# Patient Record
Sex: MALE | Race: WHITE | NOT HISPANIC OR LATINO | URBAN - METROPOLITAN AREA
[De-identification: names, ages, dates, MRNs, and addresses within clinical notes are randomized per-mention and may not be internally consistent; named-entity substitution may affect disease eponyms.]

---

## 2021-11-09 ENCOUNTER — EMERGENCY (EMERGENCY)
Facility: HOSPITAL | Age: 81
LOS: 1 days | Discharge: HOME | End: 2021-11-09
Attending: EMERGENCY MEDICINE | Admitting: EMERGENCY MEDICINE
Payer: MEDICARE

## 2021-11-09 VITALS
HEART RATE: 64 BPM | TEMPERATURE: 98 F | DIASTOLIC BLOOD PRESSURE: 75 MMHG | RESPIRATION RATE: 18 BRPM | OXYGEN SATURATION: 97 % | WEIGHT: 182.1 LBS | HEIGHT: 70 IN | SYSTOLIC BLOOD PRESSURE: 155 MMHG

## 2021-11-09 VITALS
DIASTOLIC BLOOD PRESSURE: 72 MMHG | OXYGEN SATURATION: 98 % | TEMPERATURE: 98 F | SYSTOLIC BLOOD PRESSURE: 145 MMHG | RESPIRATION RATE: 17 BRPM | HEART RATE: 62 BPM

## 2021-11-09 DIAGNOSIS — E78.5 HYPERLIPIDEMIA, UNSPECIFIED: ICD-10-CM

## 2021-11-09 DIAGNOSIS — R07.89 OTHER CHEST PAIN: ICD-10-CM

## 2021-11-09 DIAGNOSIS — Z88.2 ALLERGY STATUS TO SULFONAMIDES: ICD-10-CM

## 2021-11-09 DIAGNOSIS — Z87.891 PERSONAL HISTORY OF NICOTINE DEPENDENCE: ICD-10-CM

## 2021-11-09 DIAGNOSIS — I10 ESSENTIAL (PRIMARY) HYPERTENSION: ICD-10-CM

## 2021-11-09 LAB
ALBUMIN SERPL ELPH-MCNC: 4.2 G/DL — SIGNIFICANT CHANGE UP (ref 3.3–5)
ALP SERPL-CCNC: 73 U/L — SIGNIFICANT CHANGE UP (ref 40–120)
ALT FLD-CCNC: 22 U/L — SIGNIFICANT CHANGE UP (ref 10–45)
ANION GAP SERPL CALC-SCNC: 10 MMOL/L — SIGNIFICANT CHANGE UP (ref 5–17)
AST SERPL-CCNC: 22 U/L — SIGNIFICANT CHANGE UP (ref 10–40)
BASOPHILS # BLD AUTO: 0.05 K/UL — SIGNIFICANT CHANGE UP (ref 0–0.2)
BASOPHILS NFR BLD AUTO: 0.7 % — SIGNIFICANT CHANGE UP (ref 0–2)
BILIRUB SERPL-MCNC: 0.2 MG/DL — SIGNIFICANT CHANGE UP (ref 0.2–1.2)
BUN SERPL-MCNC: 26 MG/DL — HIGH (ref 7–23)
CALCIUM SERPL-MCNC: 9.5 MG/DL — SIGNIFICANT CHANGE UP (ref 8.4–10.5)
CHLORIDE SERPL-SCNC: 108 MMOL/L — SIGNIFICANT CHANGE UP (ref 96–108)
CK MB CFR SERPL CALC: 3.6 NG/ML — SIGNIFICANT CHANGE UP (ref 0–6.7)
CK SERPL-CCNC: 117 U/L — SIGNIFICANT CHANGE UP (ref 30–200)
CO2 SERPL-SCNC: 22 MMOL/L — SIGNIFICANT CHANGE UP (ref 22–31)
CREAT SERPL-MCNC: 1.32 MG/DL — HIGH (ref 0.5–1.3)
EOSINOPHIL # BLD AUTO: 0.17 K/UL — SIGNIFICANT CHANGE UP (ref 0–0.5)
EOSINOPHIL NFR BLD AUTO: 2.3 % — SIGNIFICANT CHANGE UP (ref 0–6)
GLUCOSE SERPL-MCNC: 134 MG/DL — HIGH (ref 70–99)
HCT VFR BLD CALC: 40 % — SIGNIFICANT CHANGE UP (ref 39–50)
HGB BLD-MCNC: 12.9 G/DL — LOW (ref 13–17)
IMM GRANULOCYTES NFR BLD AUTO: 0.4 % — SIGNIFICANT CHANGE UP (ref 0–1.5)
LYMPHOCYTES # BLD AUTO: 1.55 K/UL — SIGNIFICANT CHANGE UP (ref 1–3.3)
LYMPHOCYTES # BLD AUTO: 20.6 % — SIGNIFICANT CHANGE UP (ref 13–44)
MCHC RBC-ENTMCNC: 30.3 PG — SIGNIFICANT CHANGE UP (ref 27–34)
MCHC RBC-ENTMCNC: 32.3 GM/DL — SIGNIFICANT CHANGE UP (ref 32–36)
MCV RBC AUTO: 93.9 FL — SIGNIFICANT CHANGE UP (ref 80–100)
MONOCYTES # BLD AUTO: 0.82 K/UL — SIGNIFICANT CHANGE UP (ref 0–0.9)
MONOCYTES NFR BLD AUTO: 10.9 % — SIGNIFICANT CHANGE UP (ref 2–14)
NEUTROPHILS # BLD AUTO: 4.89 K/UL — SIGNIFICANT CHANGE UP (ref 1.8–7.4)
NEUTROPHILS NFR BLD AUTO: 65.1 % — SIGNIFICANT CHANGE UP (ref 43–77)
NRBC # BLD: 0 /100 WBCS — SIGNIFICANT CHANGE UP (ref 0–0)
PLATELET # BLD AUTO: 192 K/UL — SIGNIFICANT CHANGE UP (ref 150–400)
POTASSIUM SERPL-MCNC: 4.8 MMOL/L — SIGNIFICANT CHANGE UP (ref 3.5–5.3)
POTASSIUM SERPL-SCNC: 4.8 MMOL/L — SIGNIFICANT CHANGE UP (ref 3.5–5.3)
PROT SERPL-MCNC: 6.8 G/DL — SIGNIFICANT CHANGE UP (ref 6–8.3)
RBC # BLD: 4.26 M/UL — SIGNIFICANT CHANGE UP (ref 4.2–5.8)
RBC # FLD: 12.8 % — SIGNIFICANT CHANGE UP (ref 10.3–14.5)
SODIUM SERPL-SCNC: 140 MMOL/L — SIGNIFICANT CHANGE UP (ref 135–145)
TROPONIN T SERPL-MCNC: 0.01 NG/ML — SIGNIFICANT CHANGE UP (ref 0–0.01)
WBC # BLD: 7.51 K/UL — SIGNIFICANT CHANGE UP (ref 3.8–10.5)
WBC # FLD AUTO: 7.51 K/UL — SIGNIFICANT CHANGE UP (ref 3.8–10.5)

## 2021-11-09 PROCEDURE — 82550 ASSAY OF CK (CPK): CPT

## 2021-11-09 PROCEDURE — 93005 ELECTROCARDIOGRAM TRACING: CPT

## 2021-11-09 PROCEDURE — 84484 ASSAY OF TROPONIN QUANT: CPT

## 2021-11-09 PROCEDURE — 36415 COLL VENOUS BLD VENIPUNCTURE: CPT

## 2021-11-09 PROCEDURE — 80053 COMPREHEN METABOLIC PANEL: CPT

## 2021-11-09 PROCEDURE — 85025 COMPLETE CBC W/AUTO DIFF WBC: CPT

## 2021-11-09 PROCEDURE — 99283 EMERGENCY DEPT VISIT LOW MDM: CPT | Mod: 25

## 2021-11-09 PROCEDURE — 71046 X-RAY EXAM CHEST 2 VIEWS: CPT

## 2021-11-09 PROCEDURE — 71046 X-RAY EXAM CHEST 2 VIEWS: CPT | Mod: 26,77

## 2021-11-09 PROCEDURE — 82553 CREATINE MB FRACTION: CPT

## 2021-11-09 PROCEDURE — 99285 EMERGENCY DEPT VISIT HI MDM: CPT | Mod: 25

## 2021-11-09 PROCEDURE — 71046 X-RAY EXAM CHEST 2 VIEWS: CPT | Mod: 26

## 2021-11-09 NOTE — ED PROVIDER NOTE - PATIENT PORTAL LINK FT
You can access the FollowMyHealth Patient Portal offered by [facility] by registering at the following website: [email or web address]. By joining [employer]’s FollowMyHealth portal, you will also be able to view your health information using other applications (apps) compatible with our system.

## 2021-11-09 NOTE — ED ADULT NURSE NOTE - CHIEF COMPLAINT QUOTE
Patient:   JASON QUINN            MRN: [id number]            FIN: [id number]              Age:   79 years     Sex:  MALE     :  44   Associated Diagnoses:   None   Author:   KANU AGUILAR     Basic Information   Admit information:     Today's Information:  .  Denies any shortness of breath no cough no fever no chills, blood sugars improved . no pain in the ankle . awaiting snf acceptance  .      Review of Systems   Constitutional:  No fever, No chills.    Eye:  No recent visual problem.    Ear/Nose/Mouth/Throat:  No decreased hearing.    Respiratory:  No shortness of breath, No cough.    Cardiovascular:  No chest pain, No palpitations.    Gastrointestinal:  No nausea, No vomiting.    Genitourinary:  No dysuria.    Hematology/Lymphatics:  No bruising tendency.    Neurologic:  Alert and oriented X4.    Psychiatric:  No anxiety.    ROS reviewed as documented in chart     Health Status   Allergies: Allergies (4) Active Reaction  Cipro None Documented  Latex naproxen  naproxen None Documented  PCN (penicillins) None Documented      Current medications: Home Medications (14) Active  Anoro Ellipta inhaler oral 62.5-25 mcg/puff powder 1 puff, Inhaled, Daily  cloNIDine topical 0.1 mg/24 hr weekly patch 1 patch, TransDermal, Q7 Days  Colace (sodium) oral 100 mg capsule 100 mg = 1 cap, Oral, Daily  Coreg oral 6.25 tablet 6.25 mg = 1 tab, Oral, BID  ergocalciferol (vitamin D2) oral 50,000 unit (1.25 mg) capsule 50,000 unit = 1 cap, Oral, Q Month  indapamide oral 2.5 mg tablet 2.5 mg = 1 tab, Oral, QAM  insulin glargine (Lantus) subcutaneous injection 100 unit/mL 35 unit, Subcutaneous, Q Bedtime  isosorbide mononitrate oral 30 mg ER tablet (Imdur) 30 mg = 1 tab, Oral, TID  Lasix oral 20 mg tablet 20 mg = 1 tab, Oral, Q Bedtime  Lipitor oral 20 mg tablet 20 mg = 1 tab, Oral, Q Bedtime  lisinopril oral 20 mg tablet 20 mg = 1 tab, Oral, Daily  melatonin oral 3 mg tablet 3 mg = 1 tab, PRN, Oral, Q  Bedtime  nicotine 21 mg/24 hr transdermal film, extended release 1 patch, TransDermal, Daily  potassium CHLORIDE oral 20 mEq ER tablet (KCl / K-Dur) 20 mEq = 1 tab, Oral, BID  Medications (27) Active  Scheduled: (15)  *Potassium Protocol Communication  1 each, N/A, Daily  *Potassium Protocol Communication  1 each, N/A, Daily  AmLODIPine 5 mg tab  5 mg 1 tab, Oral, Daily  Atorvastatin 20 mg tab  20 mg 1 tab, Oral, Q Bedtime  Carvedilol 25 mg tab  25 mg 1 tab, Oral, Q12H  CloNIDine TTS 0.1 mg/24 hr weekly ER patch  1 patch, TransDermal, Q7 Days  Docusate sodium 100 mg cap  100 mg 1 cap, Oral, Daily  Ergocalciferol 50,000 unit (1.25 mg) cap  50,000 unit 1 cap, Oral, Q Month  Heparin 5,000 unit/1 mL inj MDV  5,000 unit 1 mL, Subcutaneous, Q8H  HydrALAZINE 50 mg tab  100 mg 2 tab, Oral, Q8H  Indapamide 2.5 mg tab  2.5 mg 1 tab, Oral, QAM  insulin glargine  10 unit 0.1 mL, Subcutaneous, Q Bedtime  Nicotine 21 mg/24 hr daily ER patch  1 patch, TransDermal, Daily  nicotine topical PATCH REMOVAL`  Remove Patch:, TransDermal, Q Bedtime  Umeclidinium-vilanterol 62.5-25 mcg inhalation powder 7s  1 puff, Inhaled, Daily  Continuous: (0)  PRN: (12)  Acetaminophen 325 mg tab  650 mg 2 tab, Oral, Q6H  Chloraseptic (phenol) 1.4% spray 180 mL BULK  1 spray, Oral Mucosa, QID  Dextrose (glucose) 40% 15 gm/37.5 gm oral gel UD  15 gm, Oral, As Directed PRN  Dextrose (glucose) 50% 25 gm/50 mL syringe  12.5 gm 25 mL, IV Push, As Directed PRN  DiphenhydrAMINE 25 mg cap  25 mg 1 cap, Oral, Q Bedtime  Docusate sodium 100 mg cap  100 mg 1 cap, Oral, BID  Glucagon 1 mg/1 mL emergency kit SDV  1 mg 1 mL, IM, As Directed PRN  GuaiFENesin 200 mg/10 mL oral liquid repack  100 mg 5 mL, Oral, Q4H  HydrALAZINE 25 mg tab  25 mg 1 tab, Oral, Q6H  Hydrocodone-acetaminophen 5-325 mg tab  1 tab, Oral, Q6H  Melatonin 3 mg tab  3 mg 1 tab, Oral, Q Bedtime  Ondansetron 4 mg/2 mL inj SDV  4 mg 2 mL, Slow IV Push, Q8H        Physical Examination   Intake and  Output   LINES  Peripheral Intravenous Antecubital Right   Gauge: 20   Charted: 07/01/20 19:45  Inserted: 06/30/20   Days Since Insertion: 1 days  Indication of Use: Saline Lock  I & O between:  01-JUL-2020 16:15 TO 02-JUL-2020 16:15  Med Dosing Weight:  100  kg   22-JUN-2020  24 Hour Intake:   0.00  ( 0.00 mL/kg )  24 Hour Output:   1250.00           24 Hour Urine/Stool Output:   0.0  24 Hour Balance:   -1250.00           24 Hour Urine Output:   1250.00  ( 0.52 mL/kg/hr )                   Urine Count:  3.00         VS/Measurements   Vitals between:   01-JUL-2020 16:15:34   TO   02-JUL-2020 16:15:34                   LAST RESULT MINIMUM MAXIMUM  Temperature 36.6 36.3 36.8  Heart Rate 70 55 70  Respiratory Rate 16 16 18  NISBP           145 145 179  NIDBP           70 69 85  SpO2                    96 93 96      General:  Alert and oriented, No acute distress.    Eye:  Normal conjunctiva.    HENT:  Normocephalic, Normal hearing.    Neck:  Supple.    Respiratory:  Lungs are clear to auscultation, Respirations are non-labored.   Cardiovascular:  Normal rate, Regular rhythm.    Gastrointestinal:  Soft, Non-tender, Non-distended.    Musculoskeletal     Left foot Ace wrap in place.     Integumentary:  Warm.    Neurologic:  Alert, No focal deficits.    Psychiatric:  Cooperative, Appropriate mood & affect.      Review / Management   Laboratory Results   Labs between:  01-JUL-2020 16:15 to 02-JUL-2020 16:15  CBC:                 WBC  HgB  Hct  Plt  MCV  RDW   02-JUL-2020 4.7  (L) 8.7  (L) 27.1  (L) 116  94.1  13.1   DIFF:                 Seg  Neutroph//ABS  Lymph//ABS  Mono//ABS  EOS/ABS  02-JUL-2020 NOT APPLICABLE  53 // 2.5 28 // 1.3 13 // 0.6 6 // 0.3  BMP:                 Na  Cl  BUN  Glu   02-JUL-2020 142  104  (H) 61  (H) 127                              K  CO2  Cr  Ca                              3.8  28  (H) 3.20  (L) 8.2   POC GLU:                 Latest Result  Latest Date  Minimum  Min Date  Maximum  Max Date                              (H) 115  02-JUL-2020 (H) 115  02-JUL-2020 (H) 149  01-JUL-2020                     Radiology results       Lines and Tubes:    LINES  Peripheral Intravenous Antecubital Right   Gauge: 20   Charted: 07/01/20 19:45  Inserted: 06/30/20   Days Since Insertion: 1 days  Indication of Use: Saline Lock   .      Impression and Plan   Diagnosis     79-year-old history of lung cancer with liver mets, diabetes mellitus, hypertension, hyperlipidemia, CKD, renal cancer status post nephrectomy, bladder cancer, nicotine dependence initially admitted secondary to presyncope from hypotension after taking multiple antihypertensive medications and diuretics.  Presyncope from hypotension cardiology following patient currently on amlodipine, Coreg, clonidine patch, PRN hydralazine and indapamide blood pressure has been in 150s to 160s range with those medications cardiology following  CKD 4 creatinine stable needs outpatient follow-up with nephrology  acute left ankle fracture podiatry following has protective boot needs podiatry follow-up follow-up outpatient needs physical therapy awaiting to go to rehab  Diabetes mellitus with episode of hypoglycemia patient had blood sugars down to 30s .  decreased the dose of Lantus to 12 units. blood sugars improved  Hypertension currently on multiple medications with blood pressure in the 150s to 160s range  Dyslipidemia  continue statins  Hypertensive heart disease with CKD with resistant hypertension follow cardiology recommendations watch for orthostatic hypotension  Metastatic lung cancer patient supposed to get outpatient immunotherapy which is supposed to be mid July patient hesitant to go to rehab for that explained the patient that he will probably be able to do well with the physical therapy and be discharged by then physical therapy recommending dilated daily therapy  Acute thrombocytopenia platelet count slowly coming up needs outpatient follow-up with hematology   Full code  Inpatient  DVT prophylaxis subcu heparin  Did discuss with MARCELLE yang,, Dr. Magana  d/w marcelle yang and catrina from .  Per Catrina accepted at [facility] but due to the holiday weekend patient cannot go to rehab until Monday and they are requesting repeat covid  test. will rpt covid on sunday   .     Course:  Progressing as expected.   Patient c/o of right sided chest pain, moved to left side of chest, noted last Sunday, pain sharp and intermittent, no SOB, no dizziness/lightheadedness.  States feels like pain indigestion as noted to occur after eating.  EKG in progress.  Denies any cardiac Hx., states has HTN.

## 2021-11-09 NOTE — ED PROVIDER NOTE - CARE PROVIDERS DIRECT ADDRESSES
,DirectAddress_Unknown,[email or web address]

## 2021-11-09 NOTE — ED PROVIDER NOTE - PROVIDER TOKENS
PROVIDER:[TOKEN:[[id number]]],PROVIDER:[TOKEN:[[id number]]],PROVIDER:[TOKEN:[[id number]]],PROVIDER:[TOKEN:[[id number]]]

## 2021-11-09 NOTE — ED PROVIDER NOTE - CARE PROVIDER_API CALL
Jumana Clarke)  Cardiovascular Medicine  [address], [address]  [place] [address]  Phone: [phone]  Fax: [phone]  Follow Up Time:     Sandeep Trent)  Cardiovascular Disease; Internal Medicine  Cardiology [place], [address] [place] [address]  Phone: [phone]  Fax: [phone]  Follow Up Time:     Sheyla Llanes)  Cardiovascular Disease; Internal Medicine  [address], [address], [address]rd [place] [address]  Phone: [phone]  Fax: [phone]  Follow Up Time:     Todd Durán  CARDIOVASCULAR DISEASE  [address] [address]  Phone: [phone]  Fax: [phone]  Follow Up Time:

## 2021-11-09 NOTE — ED ADULT NURSE REASSESSMENT NOTE - NS ED NURSE REASSESS COMMENT FT1
pt endorsed from ZOFIA Esquivel for continuous care. currently, pt reports the chest pain feels better. will continue to monitor

## 2021-11-09 NOTE — ED PROVIDER NOTE - CLINICAL SUMMARY MEDICAL DECISION MAKING FREE TEXT BOX
CP - likely musculoskeletal.  Low susp for ACS.  Will encourage increase PO hydration for mild elev creatinine; pt states this is not new. Abd benign. Has good follow up in [place], but will be here for another 10 days.  Will recommend follow up with cardiologist in [facility]; return precautions given.  Pt expressed clear understanding. Heart score 3. low risk. Trop wnl. ekg nonischemic. CP - likely musculoskeletal.  Low susp for ACS.  Will encourage increase PO hydration for mild elev creatinine; pt states this is not new. Abd benign. Has good follow up in [place], but will be here for another 10 days.  Will recommend follow up with cardiologist in [facility]; return precautions given.  Pt expressed clear understanding.

## 2021-11-09 NOTE — ED ADULT NURSE NOTE - OBJECTIVE STATEMENT
Patient is a 80yo male reporting right sided chest pain on Sunday that progressed to the left side today. Denies shortness of breath, fevers, chills. Speaking clearly in full sentences.

## 2021-11-09 NOTE — ED PROVIDER NOTE - NS ED ROS FT
CONSTITUTIONAL: No fever, chills, or weakness  NEURO: No headache, no dizziness, no syncope; No focal weakness/tingling/numbness  EYES: No visual changes  ENT: No rhinorrhea or sore throat  PULM: No cough or dyspnea  CV: No palpitations, no edema.   GI: HPI.  No bloody stool or melena.   : No dysuria, hematuria, frequency  MSK: No neck pain or back pain, no joint pain  SKIN: no rash or unusual bruising

## 2021-11-09 NOTE — ED PROVIDER NOTE - NSFOLLOWUPINSTRUCTIONS_ED_ALL_ED_FT
Increase oral hydration.  Tylenol if needed for pain.  Please follow up with cardiologist this week or early next week.     Return to the Emergency Department if you have any new or worsening symptoms, or if you have any concerns.  ==========================    Chest Pain    WHAT YOU NEED TO KNOW:    Chest pain can be caused by a range of conditions, from not serious to life-threatening. Chest pain can be a symptom of a digestive problem, such as acid reflux or a stomach ulcer. An anxiety attack or a strong emotion, such as anger, can also cause chest pain. Infection, inflammation, or a fracture in the bones or cartilage in your chest can cause pain or discomfort. Sometimes chest pain or pressure is caused by poor blood flow to your heart (angina). Chest pain may also be caused by life-threatening conditions such as a heart attack or blood clot in your lungs.    DISCHARGE INSTRUCTIONS:    Call your local emergency number (911 in the ) or have someone call if:   •You have any of the following signs of a heart attack: ?Squeezing, pressure, or pain in your chest      ?You may also have any of the following: ?Discomfort or pain in your back, neck, jaw, stomach, or arm      ?Shortness of breath      ?Nausea or vomiting      ?Lightheadedness or a sudden cold sweat            Return to the emergency department if:   •You have chest discomfort that gets worse, even with medicine.      •You cough or vomit blood.      •Your bowel movements are black or bloody.      •You cannot stop vomiting, or it hurts to swallow.      Call your doctor if:   •You have questions or concerns about your condition or care.          Medicines:   •Medicines may be given to treat the cause of your chest pain. Examples include pain medicine, anxiety medicine, or medicines to increase blood flow to your heart.      •Do not take certain medicines without asking your healthcare provider first. These include NSAIDs, herbal or vitamin supplements, or hormones (estrogen or progestin).      •Take your medicine as directed. Contact your healthcare provider if you think your medicine is not helping or if you have side effects. Tell him or her if you are allergic to any medicine. Keep a list of the medicines, vitamins, and herbs you take. Include the amounts, and when and why you take them. Bring the list or the pill bottles to follow-up visits. Carry your medicine list with you in case of an emergency.      Healthy living tips: The following are general healthy guidelines. If the cause of your chest pain is known, your healthcare provider will give you specific guidelines to follow.  •Do not smoke. Nicotine and other chemicals in cigarettes and cigars can cause lung and heart damage. Ask your healthcare provider for information if you currently smoke and need help to quit. E-cigarettes or smokeless tobacco still contain nicotine. Talk to your healthcare provider before you use these products.      •Choose a variety of healthy foods as often as possible. Include fresh, frozen, or canned fruits and vegetables. Also include low-fat dairy products, fish, chicken (without skin), and lean meats. Your healthcare provider or a dietitian can help you create meal plans. You may need to avoid certain foods or drinks if your pain is caused by a digestion problem.  Healthy Foods           •Lower your sodium (salt) intake. Limit foods that are high in sodium, such as canned foods, salty snacks, and cold cuts. If you add salt when you cook food, do not add more at the table. Choose low-sodium canned foods as much as possible.             •Drink plenty of water every day. Water helps your body to control your temperature and blood pressure. Ask your healthcare provider how much water you should drink every day.      •Ask about activity. Your healthcare provider will tell you which activities to limit or avoid. Ask when you can drive, return to work, and have sex. Ask about the best exercise plan for you.      •Maintain a healthy weight. Ask your healthcare provider what a healthy weight is for you. Ask him or her to help you create a safe weight loss plan if you are overweight.      •Ask about vaccines you may need. Get the influenza (flu) vaccine every year as soon as recommended, usually in September or October. You may also need a pneumococcal vaccine to prevent pneumonia. The vaccine is usually given every 5 years, starting at age 65. Your healthcare provider can tell you if should get other vaccines, and when to get them.      Follow up with your healthcare provider within 72 hours, or as directed: You may need to return for more tests to find the cause of your chest pain. You may be referred to a specialist, such as a cardiologist or gastroenterologist. Write down your questions so you remember to ask them during your visits.

## 2021-11-09 NOTE — ED ADULT TRIAGE NOTE - CHIEF COMPLAINT QUOTE
Patient c/o of right sided chest pain, moved to left side of chest, noted last Sunday, pain sharp and intermittent, no SOB, no dizziness/lightheadedness.  States feels like pain indigestion as noted to occur after eating.  EKG in progress.  Denies any cardiac Hx., states has HTN.

## 2021-11-09 NOTE — ED PROVIDER NOTE - OBJECTIVE STATEMENT
80 yo m with Hx HTN and HLD controlled with meds; lives [place]icoastally between [place] and [place]; flew to [place] 4 days ago; having pain across upper abd/lower chest for the past 3 days.  Pain mainly on the left side, below (inferior) to left breast/pectoral region.  Has no pain currently; it occurs sporadically, usually with movement of his torso (ie reaching across body or twisting upper body), and lasts only apx 1 second at a time.  It has occurred apx 8-10 times today.  He and his wife walked 6 miles today without any SOB, and only had 1 episode of pain during that walk.  Has had some more belching than usual; took Pepcid AC today for it.  Has hx of GERD, was on Protonix in the past.  No hx of DVT/PE.  Gets a stress test annually, last had a stress-echo in 2021 which he says was normal.  Patient went to urgent care today for this, had a normal EKG and was told that the doctor there did not believe the pain was cardiac in nature, but advised to go to ED as a precaution.  Pt believes the pain may be musculoskeletal as a result of lifting heavy luggage to overhead compartment on his flight to [facility].      Pt is a former cigarette smoker, quit 40 yrs ago  No hx of drug use.    Family hx father  age 61 of heart disease, brother with heart disease in his 50s. 82 yo m with Hx HTN and HLD controlled with meds; [place]ico[place] between [place]; flew to [place] 4 days ago; having pain across upper abd/lower chest for the past 3 days.  Pain mainly on the left side, below (inferior) to left breast/pectoral region.  Has no pain currently; it occurs sporadically, mostly with movement of his torso (ie reaching across body or twisting upper body), and lasts only apx 1 second at a time.  It has occurred apx 8-10 times today reproducible w/ movement.  He and his wife walked 6 miles today without any SOB, and only had 1 episode of pain during that walk.  Has had some more belching than usual; took Pepcid AC today for it.  Has hx of GERD, was on Protonix in the past.  No hx of DVT/PE.  Gets a stress test annually, last had a stress-echo in 2021 which he says was normal.  Patient went to urgent care today for this, had a normal EKG and was told that the doctor there did not believe the pain was cardiac in nature, but advised to go to ED as a precaution.  Pt believes the pain may be musculoskeletal as a result of lifting heavy luggage to overhead compartment on his flight to [facility].      Pt is a former cigarette smoker, quit 40 yrs ago  No hx of drug use.    Family hx father  age 61 of heart disease, brother with heart disease in his 50s.

## 2021-11-09 NOTE — ED PROVIDER NOTE - ATTENDING CONTRIBUTION TO CARE
avss. nontoxic. NAD. no systemic sx. no active cp unless torso movement. no acute resp distress. low risk by wells. heart score 3. shared decision making. agrees w/ outpatient cards fu. risks/benefits discussed. questions answered. strict return precautions.     I saw and discussed the care of the pt directly with the ACP while the pt was in the ED. i have reviewed the ACP note and agree w/ the history, exam and plan of care.

## 2023-01-22 NOTE — ED ADULT TRIAGE NOTE - BP NONINVASIVE DIASTOLIC (MM HG)
77 [Size: ______] : The left ventricular size is [unfilled] [Wall Motion: ______] : The left ventricular wall motion is [unfilled] [Ejection Fraction: ______] : The left ventricular ejection fraction is [unfilled] [Normal] : 3. No pericardial effusion. [de-identified] : Dr. Kvng Farfan (card); Dr. Brianna Tsai (PCP) [de-identified] : Nicole Petri-Schoener, JANETCS [de-identified] : shortness of breath [de-identified] : 1.2 [de-identified] : 5.3 [de-identified] : 1.1 [de-identified] : 3.7 [de-identified] : 3.9 [de-identified] : 4.5 [de-identified] : 40-40 [de-identified] : inferoseptal and apical inferior hypokinesis as well as the apical segments [de-identified] : Mild concentric left ventricular hypertrophy. There is grade II diastolic dysfunction. [de-identified] : The left atrium is normal in size. The left atrial volume index is ml/m2. [de-identified] : The aortic valve is trileaflet.  The leaflets have normal excursion.  There is no significant aortic stenosis.  There is mild aortic regurgitation. [de-identified] : There is trace pulmonic insufficiency. [de-identified] : There is minimal tricuspid regurgitation. [de-identified] : The inferior vena cava measures 1.6 cm. [de-identified] : The aortic root is dilated at 3.9 cm.  The ascending aorta and aortic are normal in size.  The pulmonary artery appears normal in size. [de-identified] : Dilated aortic root 3.9 cm  [de-identified] : Mildly reduced left ventricular LVEF 40-45% there is inferoseptal and apical inferior hypokinesis as well as the apical segments [FreeTextEntry1] : : 1951\par Age: 69y\par Sex: M \par BP: 146/80\par Height: 173 cm\par Weight: 112 kg\par BSA: 2.2 m2\par

## 2023-05-23 NOTE — ED PROVIDER NOTE - DISCHARGE DATE
Detail Level: Detailed
Render Risk Assessment In Note?: no
Additional Notes: Patient advised to avoid scented products and to switch to bar soap only
Additional Notes: Plan: Recommendations\\n\\nThe following recommendations were made during the visit:\\nOTC Vaseline or Aquaphor to irritated areas while skin is damp\\nPlan: Additional Notes\\n\\nPatient advised to avoid scented products and to switch to bar soap only
09-Nov-2021